# Patient Record
Sex: FEMALE
[De-identification: names, ages, dates, MRNs, and addresses within clinical notes are randomized per-mention and may not be internally consistent; named-entity substitution may affect disease eponyms.]

---

## 2019-06-17 ENCOUNTER — HOSPITAL ENCOUNTER (EMERGENCY)
Dept: HOSPITAL 97 - ER | Age: 29
Discharge: HOME | End: 2019-06-17
Payer: SELF-PAY

## 2019-06-17 DIAGNOSIS — Z3A.11: ICD-10-CM

## 2019-06-17 DIAGNOSIS — O26.891: Primary | ICD-10-CM

## 2019-06-17 DIAGNOSIS — R10.30: ICD-10-CM

## 2019-06-17 LAB — UA COMPLETE W REFLEX CULTURE PNL UR: (no result)

## 2019-06-17 PROCEDURE — 81025 URINE PREGNANCY TEST: CPT

## 2019-06-17 PROCEDURE — 99283 EMERGENCY DEPT VISIT LOW MDM: CPT

## 2019-06-17 PROCEDURE — 81003 URINALYSIS AUTO W/O SCOPE: CPT

## 2019-06-17 PROCEDURE — 81015 MICROSCOPIC EXAM OF URINE: CPT

## 2019-06-17 PROCEDURE — 76801 OB US < 14 WKS SINGLE FETUS: CPT

## 2019-06-17 NOTE — ER
Nurse's Notes                                                                                     

 [facility]                                                                 

Name: Danyelle Valladares                                                                            

Age: 29 yrs                                                                                       

Sex: Female                                                                                       

: 1990                                                                                   

MRN: [id number]                                                                                   

Arrival Date: 2019                                                                          

Time: 19:32                                                                                       

Account#: [id number]                                                                            

Bed 15                                                                                            

Private MD:                                                                                       

Diagnosis: Pregnancy related conditions, unspecified, first trimester;Lower abdominal pain,       

  unspecified                                                                                     

                                                                                                  

Presentation:                                                                                     

                                                                                             

19:41 Presenting complaint: Patient states: intermittent abd cramps X1 month. pt stated she   ak1 

      is from OT and does not have an Ob/GYN here yet. pt denies vaginal bleeding c/o white      

      discharge today. Transition of care: patient was not received from another setting of       

      care. Onset of symptoms is unknown. Risk Assessment: Do you want to hurt yourself or        

      someone else? Patient reports no desire to harm self or others. Initial Sepsis Screen:      

      Does the patient meet any 2 criteria? No. Patient's initial sepsis screen is negative.      

      Does the patient have a suspected source of infection? No. Patient's initial sepsis         

      screen is negative. Care prior to arrival: None.                                            

19:41 Method Of Arrival: Ambulatory                                                           ak1 

19:41 Acuity: OLU 3                                                                           ak1 

                                                                                                  

Triage Assessment:                                                                                

19:43 General: Appears in no apparent distress. Behavior is calm, cooperative, appropriate    ak1 

      for age.                                                                                    

                                                                                                  

OB/GYN:                                                                                           

19:42 LMP 3/28/2019, Pregnancy Verified, EDC 2020, Gestational age from LMP: 11 weeks 5   ak1 

      days                                                                                        

                                                                                                  

Historical:                                                                                       

- Allergies:                                                                                      

19:43 No Known Allergies;                                                                     ak1 

- Home Meds:                                                                                      

19:43 None [Active];                                                                          ak1 

- PMHx:                                                                                           

19:43 None;                                                                                   ak1 

- PSHx:                                                                                           

19:43 ;                                                                              ak1 

                                                                                                  

- Immunization history:: Adult Immunizations unknown.                                             

- Social history:: Smoking status: Patient/guardian denies using tobacco.                         

- Ebola Screening: : No symptoms or risks identified at this time.                                

                                                                                                  

                                                                                                  

Screenin:43 Abuse screen: Denies threats or abuse. Denies injuries from another. Nutritional        ak1 

      screening: No deficits noted. Tuberculosis screening: No symptoms or risk factors           

      identified. Fall Risk None identified.                                                      

                                                                                                  

Assessment:                                                                                       

20:25 General: Appears in no apparent distress. Behavior is calm, cooperative, appropriate    ea  

      for age. Pain: Complains of pain in abdomen. Neuro: Level of Consciousness is awake,        

      alert, obeys commands, Oriented to person, place, time, situation. Cardiovascular:          

      Patient's skin is warm and dry. Respiratory: Airway is patent Respiratory effort is         

      even, unlabored, Respiratory pattern is regular, symmetrical. GI: Abdomen is                

      non-distended, Bowel sounds present X 4 quads. Abd is soft and non tender. Derm: Skin       

      is pink, warm \T\ dry.                                                                      

21:32 Reassessment: Patient and/or family updated on plan of care and expected duration. Pain ea  

      level reassessed. Patient is alert, oriented x 3, equal unlabored respirations, skin        

      warm/dry/pink. Provider at bedside updating pt on plan of care.                             

22:25 Reassessment: Patient and/or family updated on plan of care and expected duration. Pain ea  

      level reassessed. Patient is alert, oriented x 3, equal unlabored respirations, skin        

      warm/dry/pink. Discharge instruction given to patient, verbalized the understanding of      

      instruction. Pt left ED ambulatory tolerating well.                                         

                                                                                                  

Vital Signs:                                                                                      

19:42  / 67; Pulse 59; Resp 16; Temp 98.1; Pulse Ox 100% on R/A; Weight 68.04 kg (R);   ak1 

      Height 5 ft. 2 in. (157.48 cm) (R); Pain 5/10;                                              

20:30  / 64; Pulse 61; Resp 18; Pulse Ox 98% ;                                          ea  

21:39  / 62; Pulse 80; Resp 18; Pulse Ox 99% ;                                          ea  

22:15  / 62; Pulse 78; Resp 18; Temp 97.8; Pulse Ox 98% ;                               ea  

19:42 Body Mass Index 27.44 (68.04 kg, 157.48 cm)                                             ak1 

                                                                                                  

ED Course:                                                                                        

19:32 Patient arrived in ED.                                                                  es  

19:42 Triage completed.                                                                       ak1 

19:42 Arm band placed on Patient placed in an exam room, on a stretcher, Patient notified of  ak1 

      wait time.                                                                                  

19:51 Marek Murillo MD is Attending Physician.                                              gs  

20:25 Veronica Khan, MONA is Primary Nurse.                                                    ea  

20:26 Patient has correct armband on for positive identification. Bed in low position. Call   ea  

      light in reach. Side rails up X2.                                                           

21:23 US 1st Trimest Single 1st Fetus In Process Unspecified.                                 EDMS

21:43 Leo Godoy MD is Referral Physician.                                           gs  

22:26 No provider procedures requiring assistance completed. Patient did not have IV access   ea  

      during this emergency room visit.                                                           

                                                                                                  

Administered Medications:                                                                         

No medications were administered                                                                  

                                                                                                  

                                                                                                  

Outcome:                                                                                          

21:47 Discharge ordered by MD.                                                                gs  

22: Discharged to home ambulatory.                                                          ea  

22: Condition: stable                                                                           

22:26 Discharge instructions given to patient, Instructed on discharge instructions, follow       

      up and referral plans. Demonstrated understanding of instructions, follow-up care.          

22:27 Patient left the ED.                                                                    ea  

                                                                                                  

Signatures:                                                                                       

Dispatcher [employer]                           Sulma Villeda Amber RN                       RN   ak1                                                  

Veronica Khan RN                      RN   Marek Gates MD MD gs                                                   

                                                                                                  

Corrections: (The following items were deleted from the chart)                                    

22:26 22:25 Reassessment: Patient and/or family updated on plan of care and expected          ea  

      duration. Pain level reassessed. Patient is alert, oriented x 3, equal unlabored            

      respirations, skin warm/dry/pink. Discharge instruction given to patient, verbalized        

      the understanding of instruction. Pt tolerating well. ea                                    

                                                                                                  

**************************************************************************************************

## 2019-06-17 NOTE — EDPHYS
Physician Documentation                                                                           

 [facility]                                                                 

Name: Danyelle Valladares                                                                            

Age: 29 yrs                                                                                       

Sex: Female                                                                                       

: 1990                                                                                   

MRN: [id number]                                                                                   

Arrival Date: 2019                                                                          

Time: 19:32                                                                                       

Account#: [id number]                                                                            

Bed 15                                                                                            

Private MD:                                                                                       

ED Physician Marek Murillo                                                                       

HPI:                                                                                              

                                                                                             

20:50 This 29 yrs old  Female presents to ER via Ambulatory with complaints of        gs  

      Abdominal Pain, 11 WKS PREG.                                                                

20:50 The patient presents to the emergency department with cramping. The estimated           gs  

      gestational age is 11 weeks. Pregnancy course: Prenatal care: none. Associated signs        

      and symptoms: Pertinent positives: vaginal discharge, mild white, Pertinent negatives:      

      abdominal pain, dysuria, fever, vaginal bleeding, vomiting. The patient has not             

      experienced similar symptoms in the past.                                                   

                                                                                                  

OB/GYN:                                                                                           

19:42 LMP 3/28/2019, Pregnancy Verified, EDC 2020, Gestational age from LMP: 11 weeks 5   ak1 

      days                                                                                        

                                                                                                  

Historical:                                                                                       

- Allergies:                                                                                      

19:43 No Known Allergies;                                                                     ak1 

- Home Meds:                                                                                      

19:43 None [Active];                                                                          ak1 

- PMHx:                                                                                           

19:43 None;                                                                                   ak1 

- PSHx:                                                                                           

19:43 ;                                                                              ak1 

                                                                                                  

- Immunization history:: Adult Immunizations unknown.                                             

- Social history:: Smoking status: Patient/guardian denies using tobacco.                         

- Ebola Screening: : No symptoms or risks identified at this time.                                

                                                                                                  

                                                                                                  

ROS:                                                                                              

20:50 All other systems are negative.                                                         gs  

                                                                                                  

Exam:                                                                                             

20:50 Head/Face:  Normocephalic, atraumatic. Eyes:  Pupils equal round and reactive to light, gs  

      extra-ocular motions intact.  Lids and lashes normal.  Conjunctiva and sclera are           

      non-icteric and not injected.  Cornea within normal limits.  Periorbital areas with no      

      swelling, redness, or edema. ENT:  Nares patent. No nasal discharge, no septal              

      abnormalities noted.  Tympanic membranes are normal and external auditory canals are        

      clear.  Oropharynx with no redness, swelling, or masses, exudates, or evidence of           

      obstruction, uvula midline.  Mucous membranes moist. Neck:  Trachea midline, no             

      thyromegaly or masses palpated, and no cervical lymphadenopathy.  Supple, full range of     

      motion without nuchal rigidity, or vertebral point tenderness.  No Meningismus.             

      Chest/axilla:  Normal chest wall appearance and motion.  Nontender with no deformity.       

      No lesions are appreciated. Cardiovascular:  Regular rate and rhythm with a normal S1       

      and S2.  No gallops, murmurs, or rubs.  Normal PMI, no JVD.  No pulse deficits.             

      Respiratory:  Lungs have equal breath sounds bilaterally, clear to auscultation and         

      percussion.  No rales, rhonchi or wheezes noted.  No increased work of breathing, no        

      retractions or nasal flaring. Abdomen/GI:  Soft, non-tender, with normal bowel sounds.      

      No distension or tympany.  No guarding or rebound.  No evidence of tenderness               

      throughout. Back:  No spinal tenderness.  No costovertebral tenderness.  Full range of      

      motion. Skin:  Warm, dry with normal turgor.  Normal color with no rashes, no lesions,      

      and no evidence of cellulitis. MS/ Extremity:  Pulses equal, no cyanosis.                   

      Neurovascular intact.  Full, normal range of motion. Neuro:  Awake and alert, GCS 15,       

      oriented to person, place, time, and situation.  Cranial nerves II-XII grossly intact.      

      Motor strength 5/5 in all extremities.  Sensory grossly intact.  Cerebellar exam            

      normal.  Normal gait.                                                                       

20:50 Constitutional: The patient appears alert, awake.                                           

                                                                                                  

Vital Signs:                                                                                      

19:42  / 67; Pulse 59; Resp 16; Temp 98.1; Pulse Ox 100% on R/A; Weight 68.04 kg (R);   ak1 

      Height 5 ft. 2 in. (157.48 cm) (R); Pain 5/10;                                              

20:30  / 64; Pulse 61; Resp 18; Pulse Ox 98% ;                                          ea  

21:39  / 62; Pulse 80; Resp 18; Pulse Ox 99% ;                                          ea  

22:15  / 62; Pulse 78; Resp 18; Temp 97.8; Pulse Ox 98% ;                               ea  

19:42 Body Mass Index 27.44 (68.04 kg, 157.48 cm)                                             ak1 

                                                                                                  

MDM:                                                                                              

20:19 Patient medically screened.                                                             gs  

20:50 Differential diagnosis:. Data reviewed: vital signs, nurses notes.                      gs  

21:42 Counseling: I had a detailed discussion with the patient and/or guardian regarding: the gs  

      historical points, exam findings, and any diagnostic results supporting the                 

      discharge/admit diagnosis, lab results, the need for outpatient follow up. Response to      

      treatment: the patient's symptoms have markedly improved after treatment, and as a          

      result, I will discharge patient.                                                           

                                                                                                  

                                                                                             

19:51 Order name: Urine Microscopic Only; Complete Time: 20:49                                gs  

                                                                                             

20:19 Order name: Urine Dipstick--Ancillary (enter results); Complete Time: 20:49             mw2 

                                                                                             

19:51 Order name: Urine Pregnancy Test (obtain specimen); Complete Time: 20:29                gs  

                                                                                             

19:51 Order name: Urine Dipstick-Ancillary (obtain specimen); Complete Time: 20:29            gs  

                                                                                             

20:19 Order name: Urine Pregnancy--Ancillary (enter results); Complete Time: 20:49            mw2 

                                                                                             

20:52 Order name:  1st Trimest Single 1st Fetus                                               

                                                                                                  

Administered Medications:                                                                         

No medications were administered                                                                  

                                                                                                  

                                                                                                  

Disposition:                                                                                      

19 21:47 Discharged to Home. Impression: Pregnancy related conditions, unspecified,         

  first trimester, Lower abdominal pain, unspecified.                                             

- Condition is Stable.                                                                            

- Discharge Instructions: Abdominal Pain During Pregnancy.                                        

                                                                                                  

- Medication Reconciliation Form, Thank You Letter, Antibiotic Education, Prescription            

  Opioid Use form.                                                                                

- Follow up: Leo Godoy MD; When: 1 - 2 days; Reason: Re-evaluation by your              

  physician.                                                                                      

                                                                                                  

                                                                                                  

                                                                                                  

Signatures:                                                                                       

Dispatcher [employer]                           Kim Michelle RN                       RN   ak1                                                  

Veronica Khan RN RN ea Starr, Gregory, MD MD                                                      

                                                                                                  

Corrections: (The following items were deleted from the chart)                                    

22:27 21:47 2019 21:47 Discharged to Home. Impression: Pregnancy related conditions,    ea  

      unspecified, first trimester; Lower abdominal pain, unspecified. Condition is Stable.       

      Forms are Medication Reconciliation Form, Thank You Letter, Antibiotic Education,           

      Prescription Opioid Use. Follow up: Leo Godoy; When: 1 - 2 days; Reason:            

      Re-evaluation by your physician. gs                                                         

                                                                                                  

**************************************************************************************************

## 2019-06-18 NOTE — RAD REPORT
EXAM DESCRIPTION:  US - 1St Trimest Single 1St Fetus - 6/17/2019 9:23 pm

 

CLINICAL HISTORY:  Pregnancy, abdominal pain, cramping

 

Preliminary findings provided at the time of the study.

 

COMPARISON:  None.

 

FINDINGS:  No ovarian or adnexal suspicious finding. No fluid or blood in the cul-de-sac.

 

Single IUP is identified. Heart rate is 153 BPM. Gestational sac is normal in configuration. A 5 x 2 
centimeter heterogeneous subchorionic hemorrhage is present. No gross fetal anatomic abnormality seen
 at this very early age. Anatomic detail is very limited. Yolk sac is still visible. Crown-rump lengt
h measurement corresponds to 10 weeks 6 day age. Calculated BERE is 01/07/2020

 

IMPRESSION:  Single 10 week 6 day IUP. Heart rate is 153 BPM. Calculated BERE is 01/07/2020

 

A 5 x 2 centimeter subchorionic hemorrhage is present. This can be monitored on subsequent imaging th
e patient remains symptomatic.

 

No ovarian or adnexal suspicious finding.